# Patient Record
Sex: FEMALE | Race: WHITE | NOT HISPANIC OR LATINO | Employment: OTHER | URBAN - METROPOLITAN AREA
[De-identification: names, ages, dates, MRNs, and addresses within clinical notes are randomized per-mention and may not be internally consistent; named-entity substitution may affect disease eponyms.]

---

## 2020-05-28 ENCOUNTER — HISTORICAL (OUTPATIENT)
Dept: ADMINISTRATIVE | Facility: HOSPITAL | Age: 55
End: 2020-05-28

## 2021-06-08 ENCOUNTER — HISTORICAL (OUTPATIENT)
Dept: ADMINISTRATIVE | Facility: HOSPITAL | Age: 56
End: 2021-06-08

## 2021-06-08 LAB
ALBUMIN SERPL-MCNC: 4.3 G/DL (ref 3.8–4.9)
ALBUMIN/GLOB SERPL: 2 {RATIO} (ref 1.2–2.2)
ALP SERPL-CCNC: 65 IU/L (ref 48–121)
ALT SERPL-CCNC: 43 IU/L (ref 0–32)
AST SERPL-CCNC: 26 IU/L (ref 0–40)
BASOPHILS # BLD AUTO: 0.1 X10E3/UL (ref 0–0.2)
BASOPHILS NFR BLD AUTO: 1 %
BILIRUB SERPL-MCNC: 0.4 MG/DL (ref 0–1.2)
BUN SERPL-MCNC: 16 MG/DL (ref 6–24)
CALCIUM SERPL-MCNC: 9.5 MG/DL (ref 8.7–10.2)
CHLORIDE SERPL-SCNC: 102 MMOL/L (ref 96–106)
CHOLEST SERPL-MCNC: 197 MG/DL (ref 100–199)
CHOLEST/HDLC SERPL: 3.5 RATIO (ref 0–4.4)
CO2 SERPL-SCNC: 24 MMOL/L (ref 20–29)
CREAT SERPL-MCNC: 0.89 MG/DL (ref 0.57–1)
CREAT/UREA NIT SERPL: 18 (ref 9–23)
DEPRECATED CALCIDIOL+CALCIFEROL SERPL-MC: 24.4 NG/ML (ref 30–100)
EOSINOPHIL # BLD AUTO: 0.1 X10E3/UL (ref 0–0.4)
EOSINOPHIL NFR BLD AUTO: 2 %
ERYTHROCYTE [DISTWIDTH] IN BLOOD BY AUTOMATED COUNT: 12.2 % (ref 11.7–15.4)
GLOBULIN SER-MCNC: 2.2 G/DL (ref 1.5–4.5)
GLUCOSE SERPL-MCNC: 97 MG/DL (ref 65–99)
HCT VFR BLD AUTO: 40.3 % (ref 34–46.6)
HDLC SERPL-MCNC: 56 MG/DL
HGB BLD-MCNC: 13.3 G/DL (ref 11.1–15.9)
LDLC SERPL CALC-MCNC: 127 MG/DL (ref 0–99)
LYMPHOCYTES # BLD AUTO: 1.9 X10E3/UL (ref 0.7–3.1)
LYMPHOCYTES NFR BLD AUTO: 33 %
MCH RBC QN AUTO: 31.8 PG (ref 26.6–33)
MCHC RBC AUTO-ENTMCNC: 33 G/DL (ref 31.5–35.7)
MCV RBC AUTO: 96 FL (ref 79–97)
MONOCYTES # BLD AUTO: 0.6 X10E3/UL (ref 0.1–0.9)
MONOCYTES NFR BLD AUTO: 11 %
NEUTROPHILS # BLD AUTO: 3.1 X10E3/UL (ref 1.4–7)
NEUTROPHILS NFR BLD AUTO: 53 %
PLATELET # BLD AUTO: 245 X10E3/UL (ref 150–450)
POTASSIUM SERPL-SCNC: 4.3 MMOL/L (ref 3.5–5.2)
PROT SERPL-MCNC: 6.5 G/DL (ref 6–8.5)
RBC # BLD AUTO: 4.18 X10(6)/MCL (ref 3.77–5.28)
SODIUM SERPL-SCNC: 139 MMOL/L (ref 134–144)
TRIGL SERPL-MCNC: 78 MG/DL (ref 0–149)
TSH SERPL-ACNC: 1.46 MIU/ML (ref 0.45–4.5)
VLDLC SERPL CALC-MCNC: 14 MG/DL (ref 5–40)
WBC # SPEC AUTO: 5.8 X10E3/UL (ref 3.4–10.8)

## 2022-04-11 ENCOUNTER — HISTORICAL (OUTPATIENT)
Dept: ADMINISTRATIVE | Facility: HOSPITAL | Age: 57
End: 2022-04-11

## 2022-04-27 VITALS
WEIGHT: 198.19 LBS | HEIGHT: 66 IN | OXYGEN SATURATION: 96 % | DIASTOLIC BLOOD PRESSURE: 74 MMHG | SYSTOLIC BLOOD PRESSURE: 110 MMHG | BODY MASS INDEX: 31.85 KG/M2

## 2022-05-11 ENCOUNTER — HISTORICAL (OUTPATIENT)
Dept: ADMINISTRATIVE | Facility: HOSPITAL | Age: 57
End: 2022-05-11

## 2022-07-05 ENCOUNTER — HISTORICAL (OUTPATIENT)
Dept: ADMINISTRATIVE | Facility: HOSPITAL | Age: 57
End: 2022-07-05

## 2022-11-10 ENCOUNTER — DOCUMENTATION ONLY (OUTPATIENT)
Dept: FAMILY MEDICINE | Facility: CLINIC | Age: 57
End: 2022-11-10

## 2022-11-10 LAB — CRC RECOMMENDATION EXT: NORMAL

## 2023-02-22 ENCOUNTER — TELEPHONE (OUTPATIENT)
Dept: FAMILY MEDICINE | Facility: CLINIC | Age: 58
End: 2023-02-22

## 2023-02-22 DIAGNOSIS — B00.1 HERPES LABIALIS: Primary | ICD-10-CM

## 2023-02-22 RX ORDER — VALACYCLOVIR HYDROCHLORIDE 1 G/1
1000 TABLET, FILM COATED ORAL 2 TIMES DAILY
Qty: 60 TABLET | Refills: 11 | Status: SHIPPED | OUTPATIENT
Start: 2023-02-22 | End: 2024-03-14

## 2023-02-22 RX ORDER — ESTRADIOL 10 UG/1
1 INSERT VAGINAL
COMMUNITY
Start: 2023-01-18 | End: 2023-12-21 | Stop reason: SDUPTHER

## 2023-02-22 RX ORDER — SERTRALINE HYDROCHLORIDE 25 MG/1
25 TABLET, FILM COATED ORAL DAILY
COMMUNITY
Start: 2022-12-23 | End: 2023-10-02

## 2023-02-22 NOTE — TELEPHONE ENCOUNTER
Pt is asking for a refill on her valcyclovir 1g. Pt is asking for refills again. States that she has no more.         [facility]         [phone]

## 2023-04-10 ENCOUNTER — OFFICE VISIT (OUTPATIENT)
Dept: FAMILY MEDICINE | Facility: CLINIC | Age: 58
End: 2023-04-10
Payer: OTHER GOVERNMENT

## 2023-04-10 ENCOUNTER — TELEPHONE (OUTPATIENT)
Dept: FAMILY MEDICINE | Facility: CLINIC | Age: 58
End: 2023-04-10
Payer: OTHER GOVERNMENT

## 2023-04-10 VITALS
OXYGEN SATURATION: 97 % | BODY MASS INDEX: 31.76 KG/M2 | DIASTOLIC BLOOD PRESSURE: 62 MMHG | HEART RATE: 94 BPM | WEIGHT: 197.63 LBS | SYSTOLIC BLOOD PRESSURE: 114 MMHG | HEIGHT: 66 IN | TEMPERATURE: 98 F

## 2023-04-10 DIAGNOSIS — S00.83XA CONTUSION OF FACE, INITIAL ENCOUNTER: Primary | ICD-10-CM

## 2023-04-10 PROCEDURE — 99213 PR OFFICE/OUTPT VISIT, EST, LEVL III, 20-29 MIN: ICD-10-PCS | Mod: ,,, | Performed by: NURSE PRACTITIONER

## 2023-04-10 PROCEDURE — 99213 OFFICE O/P EST LOW 20 MIN: CPT | Mod: ,,, | Performed by: NURSE PRACTITIONER

## 2023-04-10 NOTE — PROGRESS NOTES
"SUBJECTIVE:  Cecilia Fleming is a 57 y.o. female here for Facial Injury (Fall, left eye)      HPI  Presents to the clinic with c/o facial swelling and bruising post a fall at home.  Reports her knee locked up and she fell on the left side of her face.  She is following with ortho for knee issues.  She has been applying antibiotic ointment to the abrasion.  She is concerned as the bruising seems to be worsening.  Explained that the bruising will likely continue to extend during the healing process.  Visual acuity done and WNL.  She denies any paint to the eye itself, just surrounding tissue.    Lacis allergies, medications, history, and problem list were updated as appropriate.    Review of Systems   Skin:  Positive for color change and wound.    A comprehensive review of symptoms was completed and negative except as noted above.    No results found for this or any previous visit (from the past 504 hour(s)).    OBJECTIVE:  Vital signs  Vitals:    04/10/23 0828   BP: 114/62   BP Location: Right arm   Patient Position: Sitting   Pulse: 94   Temp: 98.4 °F (36.9 °C)   TempSrc: Oral   SpO2: 97%   Weight: 89.6 kg (197 lb 9.6 oz)   Height: 5' 6.14" (1.68 m)        Physical Exam  Constitutional:       Appearance: Normal appearance.   HENT:      Head: Normocephalic and atraumatic.      Right Ear: Tympanic membrane, ear canal and external ear normal.      Left Ear: Tympanic membrane, ear canal and external ear normal.      Nose: Nose normal.      Mouth/Throat:      Mouth: Mucous membranes are moist.      Pharynx: Oropharynx is clear.   Eyes:      Extraocular Movements: Extraocular movements intact.      Conjunctiva/sclera: Conjunctivae normal.      Pupils: Pupils are equal, round, and reactive to light.      Comments: Periorbital edema and ecchymosis to lefrt eye with abrasion tl left cheek   Cardiovascular:      Rate and Rhythm: Normal rate and regular rhythm.   Pulmonary:      Effort: Pulmonary effort is normal.      " Breath sounds: Normal breath sounds.   Abdominal:      General: Bowel sounds are normal.      Palpations: Abdomen is soft.   Musculoskeletal:         General: Normal range of motion.      Cervical back: Normal range of motion and neck supple.   Skin:     General: Skin is warm.   Neurological:      General: No focal deficit present.      Mental Status: She is alert and oriented to person, place, and time.   Psychiatric:         Mood and Affect: Mood normal.         Behavior: Behavior normal.         Judgment: Judgment normal.        ASSESSMENT/PLAN:  1. Contusion of face, initial encounter  -     X-Ray Orbits  Min 4 Views; Future; Expected date: 04/10/2023    Encourage to clean wound twice daily and apply polysporin ointment or Aquaphor to keep skin moist with protective layer while abrasion heals.      Follow Up:  Follow up if symptoms worsen or fail to improve.

## 2023-04-10 NOTE — TELEPHONE ENCOUNTER
----- Message from Sherri Morrell NP sent at 4/10/2023 10:02 AM CDT -----  Please notify patient of results - no orbital fractures noted.

## 2023-06-07 LAB — BCS RECOMMENDATION EXT: NORMAL

## 2023-06-08 ENCOUNTER — DOCUMENTATION ONLY (OUTPATIENT)
Dept: ADMINISTRATIVE | Facility: HOSPITAL | Age: 58
End: 2023-06-08
Payer: OTHER GOVERNMENT

## 2023-06-13 DIAGNOSIS — Z79.890 HORMONE REPLACEMENT THERAPY: Primary | ICD-10-CM

## 2023-06-13 RX ORDER — ESTRADIOL 1 MG/G
1 GEL TOPICAL DAILY
Qty: 90 G | Refills: 3 | Status: SHIPPED | OUTPATIENT
Start: 2023-06-13 | End: 2024-03-18 | Stop reason: SDUPTHER

## 2023-07-03 ENCOUNTER — TELEPHONE (OUTPATIENT)
Dept: FAMILY MEDICINE | Facility: CLINIC | Age: 58
End: 2023-07-03
Payer: OTHER GOVERNMENT

## 2023-07-03 NOTE — TELEPHONE ENCOUNTER
Pt no showed for labs (06/29/23) that go with her wellness that is scheduled for this Wednesday.. please cancel appt for Wednesday and reschedule her labs and appt.

## 2023-07-18 ENCOUNTER — OFFICE VISIT (OUTPATIENT)
Dept: FAMILY MEDICINE | Facility: CLINIC | Age: 58
End: 2023-07-18
Payer: OTHER GOVERNMENT

## 2023-07-18 VITALS
SYSTOLIC BLOOD PRESSURE: 116 MMHG | BODY MASS INDEX: 31.69 KG/M2 | TEMPERATURE: 98 F | WEIGHT: 197.19 LBS | DIASTOLIC BLOOD PRESSURE: 68 MMHG | OXYGEN SATURATION: 97 % | HEIGHT: 66 IN | HEART RATE: 90 BPM

## 2023-07-18 DIAGNOSIS — J06.9 UPPER RESPIRATORY TRACT INFECTION, UNSPECIFIED TYPE: Primary | ICD-10-CM

## 2023-07-18 PROCEDURE — 99213 OFFICE O/P EST LOW 20 MIN: CPT | Mod: ,,, | Performed by: NURSE PRACTITIONER

## 2023-07-18 PROCEDURE — 99213 PR OFFICE/OUTPT VISIT, EST, LEVL III, 20-29 MIN: ICD-10-PCS | Mod: ,,, | Performed by: NURSE PRACTITIONER

## 2023-07-18 NOTE — PROGRESS NOTES
"SUBJECTIVE:  Cecilia Fleming is a 57 y.o. female here for Cough      Cough    Presents to the clinic with c/o cough and mild congestions.  Symptoms for the past 4 days.  Denies fever, n/v/d.    Candida allergies, medications, history, and problem list were updated as appropriate.    Review of Systems   HENT:  Positive for congestion (mild).    Respiratory:  Positive for cough.     A comprehensive review of symptoms was completed and negative except as noted above.    No results found for this or any previous visit (from the past 504 hour(s)).    OBJECTIVE:  Vital signs  Vitals:    07/18/23 0923   BP: 116/68   BP Location: Right arm   Patient Position: Sitting   BP Method: Medium (Manual)   Pulse: 90   Temp: 97.8 °F (36.6 °C)   TempSrc: Temporal   SpO2: 97%   Weight: 89.4 kg (197 lb 3.2 oz)   Height: 5' 6.14" (1.68 m)        Physical Exam  Constitutional:       Appearance: Normal appearance.   HENT:      Head: Normocephalic and atraumatic.      Right Ear: Tympanic membrane, ear canal and external ear normal.      Left Ear: Tympanic membrane, ear canal and external ear normal.      Nose: Nose normal.      Mouth/Throat:      Mouth: Mucous membranes are moist.      Pharynx: Oropharynx is clear. Posterior oropharyngeal erythema (mild with thick PND) present.   Eyes:      Conjunctiva/sclera: Conjunctivae normal.   Cardiovascular:      Rate and Rhythm: Normal rate and regular rhythm.   Pulmonary:      Effort: Pulmonary effort is normal.      Breath sounds: Normal breath sounds.   Abdominal:      General: Bowel sounds are normal.      Palpations: Abdomen is soft.   Musculoskeletal:         General: Normal range of motion.      Cervical back: Normal range of motion and neck supple.   Skin:     General: Skin is warm.      Capillary Refill: Capillary refill takes less than 2 seconds.   Neurological:      Mental Status: She is alert.   Psychiatric:         Mood and Affect: Mood normal.         Behavior: Behavior normal.        "  Judgment: Judgment normal.        ASSESSMENT/PLAN:  1. Upper respiratory tract infection, unspecified type  Comments:  viral illness, increase fluids, OTC cough/cold med prn, Deslym at night prn cough, if symtpoms do not resolve in 7-10 days RTC        Follow Up:  Follow up if symptoms worsen or fail to improve.

## 2023-07-26 ENCOUNTER — APPOINTMENT (OUTPATIENT)
Dept: LAB | Facility: HOSPITAL | Age: 58
End: 2023-07-26
Attending: FAMILY MEDICINE
Payer: OTHER GOVERNMENT

## 2023-08-08 ENCOUNTER — OFFICE VISIT (OUTPATIENT)
Dept: FAMILY MEDICINE | Facility: CLINIC | Age: 58
End: 2023-08-08
Payer: OTHER GOVERNMENT

## 2023-08-08 VITALS
HEIGHT: 66 IN | SYSTOLIC BLOOD PRESSURE: 122 MMHG | HEART RATE: 91 BPM | TEMPERATURE: 98 F | DIASTOLIC BLOOD PRESSURE: 68 MMHG | BODY MASS INDEX: 31.92 KG/M2 | OXYGEN SATURATION: 98 % | WEIGHT: 198.63 LBS

## 2023-08-08 DIAGNOSIS — G47.33 OBSTRUCTIVE SLEEP APNEA SYNDROME: ICD-10-CM

## 2023-08-08 DIAGNOSIS — Z00.01 ENCOUNTER FOR WELL ADULT EXAM WITH ABNORMAL FINDINGS: Primary | ICD-10-CM

## 2023-08-08 DIAGNOSIS — E78.00 PURE HYPERCHOLESTEROLEMIA: ICD-10-CM

## 2023-08-08 PROBLEM — K57.30 DIVERTICULOSIS OF COLON: Status: ACTIVE | Noted: 2023-08-08

## 2023-08-08 PROBLEM — E55.9 VITAMIN D DEFICIENCY: Status: ACTIVE | Noted: 2023-08-08

## 2023-08-08 PROBLEM — E78.5 HYPERLIPIDEMIA: Status: ACTIVE | Noted: 2023-08-08

## 2023-08-08 PROCEDURE — 99396 PR PREVENTIVE VISIT,EST,40-64: ICD-10-PCS | Mod: ,,, | Performed by: FAMILY MEDICINE

## 2023-08-08 PROCEDURE — 99396 PREV VISIT EST AGE 40-64: CPT | Mod: ,,, | Performed by: FAMILY MEDICINE

## 2023-08-08 NOTE — PROGRESS NOTES
"SUBJECTIVE:  HPI    Cecilia Fleming is a 57 y.o. female here for Follow-up (labs), annual wellness exam and follow-up on lab work.    No current problems or concerns.  She is feeling well.  Mammogram is up-to-date.  Colonoscopy is up-to-date.      Candida allergies, medications, history, and problem list were updated as appropriate.    ROS:  Constitutional:  No fever, chills, weight loss, malaise, fatigue   ENT:  No runny nose, no sore throat  Cardiovascular:  No palpitations, angina, syncope   Respiratory:  No shortness of breath, cough, hemoptysis  GI: No abdominal pain, diarrhea, change in stools  : No dysuria, hematuria  Skin:  No rashes or lesions of concern   Neuro:  No headaches, paresthesias, weakness  Endocrine:  No polyuria, polydipsia, polyphasia  Psychiatric: No depression or anxiety    OBJECTIVE:  Vital signs  Visit Vitals  /68 (BP Location: Right arm, Patient Position: Sitting)   Pulse 91   Temp 98.1 °F (36.7 °C) (Temporal)   Ht 5' 5.95" (1.675 m)   Wt 90.1 kg (198 lb 9.6 oz)   LMP  (LMP Unknown)   SpO2 98%   BMI 32.11 kg/m²          PHYSICAL EXAM:  General: Awake, alert, no acute distress  Neck:  No bruits, no masses  Cardiovascular:  Regular rhythm.  Normal rate.  No murmurs.  Respiratory: Clear to auscultation bilaterally, normal effort  Extremities:  No cyanosis, no clubbing, no edema  Skin:  No rashes or appreciable lesions   Neuro:  Cranial nerves 2-12 are intact with usual testing.  Gait is normal.  Moves all 4 extremities equally and symmetrically.  Psychiatric:  Normal mood and affect.    Chemistry:  Lab Results   Component Value Date     07/26/2023    K 4.4 07/26/2023    BUN 18.0 07/26/2023    CREATININE 0.94 07/26/2023    EGFRNORACEVR 71 07/26/2023    GLUCOSE 100 07/26/2023    CALCIUM 9.6 07/26/2023    ALKPHOS 81 07/26/2023    AST 32 07/26/2023    ALT 44 07/26/2023    OZDFMPTO33DK 24.4 (L) 06/08/2021    TSH 2.630 07/26/2023        Lab Results   Component Value Date    HGBA1C " "5.6 07/26/2023        Hematology:  Lab Results   Component Value Date    WBC 7.11 07/26/2023    HGB 13.5 07/26/2023    MCV 94.4 07/26/2023     07/26/2023       Lipid Panel:  Lab Results   Component Value Date    CHOL 205 (H) 07/26/2023    HDL 47 07/26/2023    DLDL 132.2 (H) 07/26/2023    TRIG 124 07/26/2023    TOTALCHOLEST 3.5 06/08/2021        ASSESSMENT/PLAN:  1. Encounter for well adult exam with abnormal findings  Assessment & Plan:  Therapeutic lifestyle changes to include low-fat diet, regular exercise    Orders:  -     Lipid Panel; Future; Expected date: 08/08/2024  -     CBC Auto Differential; Future; Expected date: 08/08/2024  -     Comprehensive Metabolic Panel; Future; Expected date: 08/08/2024  -     TSH; Future; Expected date: 08/08/2024  -     Hemoglobin A1C; Future; Expected date: 08/07/2024    2. Pure hypercholesterolemia  Overview:  Lab Results   Component Value Date    CHOL 205 (H) 07/26/2023    CHOL 197 06/08/2021     Lab Results   Component Value Date    HDL 47 07/26/2023    HDL 56 06/08/2021     No results found for: "LDLCALC"  Lab Results   Component Value Date    DLDL 132.2 (H) 07/26/2023     Lab Results   Component Value Date    TRIG 124 07/26/2023    TRIG 78 06/08/2021       f1 No results found for: "CHOLHDL"      Assessment & Plan:  Therapeutic lifestyle changes     Continue to monitor    Orders:  -     Lipid Panel; Future; Expected date: 08/08/2024    3. Obstructive sleep apnea syndrome  Overview:  on CPAP from sleep disorder center of [facility]    Assessment & Plan:  Compliant with CPAP.  Symptoms controlled.      Follow Up:  Follow up in about 1 year (around 8/8/2024) for Fasting labs, Well Adult.          "

## 2023-09-14 ENCOUNTER — OFFICE VISIT (OUTPATIENT)
Dept: FAMILY MEDICINE | Facility: CLINIC | Age: 58
End: 2023-09-14
Payer: OTHER GOVERNMENT

## 2023-09-14 VITALS
SYSTOLIC BLOOD PRESSURE: 122 MMHG | HEIGHT: 66 IN | BODY MASS INDEX: 32.24 KG/M2 | DIASTOLIC BLOOD PRESSURE: 72 MMHG | HEART RATE: 75 BPM | TEMPERATURE: 98 F | WEIGHT: 200.63 LBS | OXYGEN SATURATION: 96 %

## 2023-09-14 DIAGNOSIS — S93.145S: Primary | ICD-10-CM

## 2023-09-14 DIAGNOSIS — E78.00 PURE HYPERCHOLESTEROLEMIA: ICD-10-CM

## 2023-09-14 DIAGNOSIS — G47.33 OBSTRUCTIVE SLEEP APNEA SYNDROME: ICD-10-CM

## 2023-09-14 DIAGNOSIS — Z01.818 PREOPERATIVE CLEARANCE: ICD-10-CM

## 2023-09-14 PROBLEM — S93.142A: Status: ACTIVE | Noted: 2023-09-14

## 2023-09-14 PROCEDURE — 99214 OFFICE O/P EST MOD 30 MIN: CPT | Mod: ,,, | Performed by: FAMILY MEDICINE

## 2023-09-14 PROCEDURE — 99214 PR OFFICE/OUTPT VISIT, EST, LEVL IV, 30-39 MIN: ICD-10-PCS | Mod: ,,, | Performed by: FAMILY MEDICINE

## 2023-09-14 NOTE — PROGRESS NOTES
"SUBJECTIVE:  HPI    Cecilia Flmeing is a 57 y.o. female here for Pre-operative Clearance for left 1st metatarsophalangeal joint subluxation surgery.    She denies any chest pain, angina, dyspnea, exertional symptoms.      Lacis allergies, medications, history, and problem list were updated as appropriate.    ROS:  Pertinent ROS as above, otherwise negative    OBJECTIVE:  Vital signs  Visit Vitals  /72 (BP Location: Left arm, Patient Position: Sitting)   Pulse 75   Temp 97.5 °F (36.4 °C) (Temporal)   Ht 5' 5.95" (1.675 m)   Wt 91 kg (200 lb 9.6 oz)   LMP  (LMP Unknown)   SpO2 96%   BMI 32.43 kg/m²          PHYSICAL EXAM:  General: Awake, alert, no acute distress   Neck:  No bruits, no masses  Cardiovascular:  Regular rhythm.  Normal rate.  No murmurs.  Respiratory: Clear to auscultation bilaterally, normal effort  Abdomen: Soft, nontender, nondistended, no hepatosplenomegaly   Extremities:  No cyanosis, no clubbing, no edema  Skin:  No rashes or appreciable lesions   Neuro:  Cranial nerves 2-12 are intact with usual testing.  Gait is normal.  Moves all 4 extremities equally and symmetrically.  Psychiatric:  Normal mood and affect.    September 12, 2023:  CBC and BMP normal (glucose 109)     September 12, 2023, EKG:  Normal sinus rhythm, normal intervals, normal complexes.  Normal EKG.    ASSESSMENT/PLAN:  1. Subluxation of metatarsophalangeal joint of left lesser toe(s), sequela  Assessment & Plan:  2nd toe      2. Preoperative clearance  Assessment & Plan:  Cleared at very low risk      3. Obstructive sleep apnea syndrome  Overview:  on CPAP from sleep disorder center [facility]      4. Pure hypercholesterolemia  Overview:  Lab Results   Component Value Date    CHOL 205 (H) 07/26/2023    CHOL 197 06/08/2021     Lab Results   Component Value Date    HDL 47 07/26/2023    HDL 56 06/08/2021     No results found for: "LDLCALC"  Lab Results   Component Value Date    DLDL 132.2 (H) 07/26/2023     Lab Results " "  Component Value Date    TRIG 124 07/26/2023    TRIG 78 06/08/2021       f1 No results found for: "CHOLHDL"          "

## 2023-09-30 DIAGNOSIS — F32.5 MAJOR DEPRESSIVE DISORDER IN FULL REMISSION, UNSPECIFIED WHETHER RECURRENT: Primary | ICD-10-CM

## 2023-10-02 RX ORDER — SERTRALINE HYDROCHLORIDE 25 MG/1
25 TABLET, FILM COATED ORAL
Qty: 90 TABLET | Refills: 3 | Status: SHIPPED | OUTPATIENT
Start: 2023-10-02

## 2023-11-13 PROBLEM — Z00.01 ENCOUNTER FOR WELL ADULT EXAM WITH ABNORMAL FINDINGS: Status: RESOLVED | Noted: 2023-08-08 | Resolved: 2023-11-13

## 2023-11-20 ENCOUNTER — TELEPHONE (OUTPATIENT)
Dept: FAMILY MEDICINE | Facility: CLINIC | Age: 58
End: 2023-11-20
Payer: OTHER GOVERNMENT

## 2023-11-20 DIAGNOSIS — K57.92 ACUTE DIVERTICULITIS: Primary | ICD-10-CM

## 2023-11-20 RX ORDER — CIPROFLOXACIN 500 MG/1
500 TABLET ORAL 2 TIMES DAILY
Qty: 20 TABLET | Refills: 0 | Status: SHIPPED | OUTPATIENT
Start: 2023-11-20 | End: 2023-11-30

## 2023-11-20 RX ORDER — METRONIDAZOLE 500 MG/1
500 TABLET ORAL 3 TIMES DAILY
Qty: 30 TABLET | Refills: 0 | Status: SHIPPED | OUTPATIENT
Start: 2023-11-20 | End: 2023-11-30

## 2023-11-20 NOTE — TELEPHONE ENCOUNTER
----- Message from Yadira Mcdaniels LPN sent at 11/20/2023  9:06 AM CST -----  Regarding: FW: Appt or call out  Can something be sent in or does she need to be seen?  ----- Message -----  From: Dory Celaya MA  Sent: 11/20/2023   8:18 AM CST  To: Levon TURNER Staff  Subject: Appt or call out                                 She's having a bout of diverticulitis and either needs an appointment or have some meds called out.     [phone]

## 2023-12-21 DIAGNOSIS — Z79.890 HORMONE REPLACEMENT THERAPY: Primary | ICD-10-CM

## 2023-12-21 DIAGNOSIS — Z79.890 HORMONE REPLACEMENT THERAPY: ICD-10-CM

## 2023-12-21 RX ORDER — ESTRADIOL 10 UG/1
1 INSERT VAGINAL
Qty: 24 TABLET | Refills: 3 | Status: SHIPPED | OUTPATIENT
Start: 2023-12-21 | End: 2023-12-21 | Stop reason: SDUPTHER

## 2023-12-21 RX ORDER — ESTRADIOL 10 UG/1
1 INSERT VAGINAL
Qty: 24 TABLET | Refills: 3 | Status: SHIPPED | OUTPATIENT
Start: 2023-12-21 | End: 2024-03-14

## 2023-12-21 NOTE — TELEPHONE ENCOUNTER
Refill request faxed from outpatient for Vagifem 10 mcg tab, insert one tab vaginally twice a week

## 2024-03-14 ENCOUNTER — OFFICE VISIT (OUTPATIENT)
Dept: FAMILY MEDICINE | Facility: CLINIC | Age: 59
End: 2024-03-14
Payer: OTHER GOVERNMENT

## 2024-03-14 VITALS
HEART RATE: 85 BPM | DIASTOLIC BLOOD PRESSURE: 70 MMHG | SYSTOLIC BLOOD PRESSURE: 118 MMHG | TEMPERATURE: 98 F | OXYGEN SATURATION: 95 % | WEIGHT: 197.63 LBS | HEIGHT: 66 IN | BODY MASS INDEX: 31.76 KG/M2

## 2024-03-14 DIAGNOSIS — J01.80 ACUTE NON-RECURRENT SINUSITIS OF OTHER SINUS: Primary | ICD-10-CM

## 2024-03-14 PROBLEM — J01.90 ACUTE NON-RECURRENT SINUSITIS: Status: ACTIVE | Noted: 2024-03-14

## 2024-03-14 PROCEDURE — 99214 OFFICE O/P EST MOD 30 MIN: CPT | Mod: ,,, | Performed by: FAMILY MEDICINE

## 2024-03-14 RX ORDER — PREDNISONE 20 MG/1
60 TABLET ORAL DAILY
Qty: 15 TABLET | Refills: 0 | Status: SHIPPED | OUTPATIENT
Start: 2024-03-14 | End: 2024-03-19

## 2024-03-14 RX ORDER — AZITHROMYCIN 250 MG/1
TABLET, FILM COATED ORAL
Qty: 6 TABLET | Refills: 0 | Status: SHIPPED | OUTPATIENT
Start: 2024-03-14 | End: 2024-03-18

## 2024-03-14 NOTE — PROGRESS NOTES
"SUBJECTIVE:  HPI    Cecilia Fleming is a 58 y.o. female here for Cough (Since Friday), Fever, Ear Fullness (bilateral), Nasal Congestion, and Chest Congestion.  Six day history of persistent sinus congestion, left aural fullness, chest congestion and cough as well as facial pain and pressure.  She felt really bad for 2 days with low-grade fever of 100° generalized myalgias.  Feels a bit better today still has a lot of facial pain and pressure.      Lacis allergies, medications, history, and problem list were updated as appropriate.    ROS:  Pertinent ROS as above, otherwise negative    OBJECTIVE:  Vital signs  Visit Vitals  /70 (BP Location: Left arm, Patient Position: Sitting, BP Method: Medium (Manual))   Pulse 85   Temp 98.1 °F (36.7 °C) (Temporal)   Ht 5' 5.95" (1.675 m)   Wt 89.6 kg (197 lb 9.6 oz)   LMP  (LMP Unknown)   SpO2 95%   BMI 31.95 kg/m²          PHYSICAL EXAM:  General:  Awake, alert, no acute distress   Eyes:  Pupils equal, round, reactive to light.  Conjunctiva normal bilaterally.  Ears:  Bilateral external auditory canals normal.  Bilateral tympanic membranes normal.  Nares:  Bilateral turbinate erythema and edema with clear rhinorrhea.  Oropharynx:  Postnasal drainage present.  No erythema or exudate.  Neck:  No lymphadenopathy  Cardiovascular: Regular rate and rhythm.  No murmurs.  Respiratory: Clear to auscultation bilaterally, normal effort  Skin: No rashes    ASSESSMENT/PLAN:  1. Acute non-recurrent sinusitis of other sinus  Assessment & Plan:  Probable secondary sinusitis after recent viral illness     Prednisone for 5 days with Z-Tala    Orders:  -     azithromycin (Z-TALA) 250 MG tablet; Take 2 tablets (500 mg total) by mouth once daily for 1 day, THEN 1 tablet (250 mg total) once daily for 4 days.  Dispense: 6 tablet; Refill: 0  -     predniSONE (DELTASONE) 20 MG tablet; Take 3 tablets (60 mg total) by mouth once daily. for 5 days  Dispense: 15 tablet; Refill: 0        "

## 2024-03-18 ENCOUNTER — OFFICE VISIT (OUTPATIENT)
Dept: OBSTETRICS AND GYNECOLOGY | Facility: CLINIC | Age: 59
End: 2024-03-18
Payer: OTHER GOVERNMENT

## 2024-03-18 VITALS
DIASTOLIC BLOOD PRESSURE: 77 MMHG | SYSTOLIC BLOOD PRESSURE: 123 MMHG | WEIGHT: 200 LBS | BODY MASS INDEX: 33.32 KG/M2 | HEART RATE: 77 BPM | HEIGHT: 65 IN

## 2024-03-18 DIAGNOSIS — Z79.890 HORMONE REPLACEMENT THERAPY: ICD-10-CM

## 2024-03-18 DIAGNOSIS — Z78.0 MENOPAUSE: ICD-10-CM

## 2024-03-18 DIAGNOSIS — Z01.419 WELL WOMAN EXAM WITH ROUTINE GYNECOLOGICAL EXAM: ICD-10-CM

## 2024-03-18 DIAGNOSIS — Z12.31 SCREENING MAMMOGRAM FOR BREAST CANCER: ICD-10-CM

## 2024-03-18 DIAGNOSIS — Z12.4 SCREENING FOR MALIGNANT NEOPLASM OF CERVIX: Primary | ICD-10-CM

## 2024-03-18 PROCEDURE — 99396 PREV VISIT EST AGE 40-64: CPT | Mod: S$GLB,,, | Performed by: OBSTETRICS & GYNECOLOGY

## 2024-03-18 RX ORDER — TIRZEPATIDE 5 MG/.5ML
5 INJECTION, SOLUTION SUBCUTANEOUS
Qty: 4 PEN | Refills: 6 | Status: SHIPPED | OUTPATIENT
Start: 2024-03-18 | End: 2024-05-03

## 2024-03-18 RX ORDER — ESTRADIOL 10 UG/1
10 INSERT VAGINAL
Qty: 24 TABLET | Refills: 3 | Status: SHIPPED | OUTPATIENT
Start: 2024-03-18 | End: 2025-03-18

## 2024-03-18 RX ORDER — ESTRADIOL 1 MG/G
1 GEL TOPICAL DAILY
Qty: 90 G | Refills: 3 | Status: SHIPPED | OUTPATIENT
Start: 2024-03-18

## 2024-03-18 NOTE — PROGRESS NOTES
Subjective:       Patient ID: Cecilia Fleming is a 58 y.o. female.    Chief Complaint:  Well Woman      History of Present Illness  She is doing well.  No new health issues,  No abnormal bleeding, No GI or Gu concerns,  No dyspareunia.   She is doing well  is a former patient of [facility].    She has her ovaries.    She is on her HRT   HPI      GYN & OB History  No LMP recorded (lmp unknown). Patient has had a hysterectomy.     Date of Last Pap: No result found    OB History    Para Term  AB Living   8 8 8     8   SAB IAB Ectopic Multiple Live Births                  # Outcome Date GA Lbr Phani/2nd Weight Sex Delivery Anes PTL Lv   8 Term            7 Term            6 Term            5 Term            4 Term            3 Term            2 Term            1 Term                Review of Systems  Review of Systems   Constitutional:  Negative for activity change.   Eyes:  Negative for visual disturbance.   Respiratory:  Negative for shortness of breath.    Cardiovascular:  Negative for chest pain.   Gastrointestinal:  Negative for abdominal pain.   Genitourinary:  Negative for vaginal bleeding.        No abnormal vaginal bleeding   Musculoskeletal:  Negative for back pain.   Integumentary:  Negative for rash and breast mass.   Neurological:  Negative for numbness.   Psychiatric/Behavioral:          No mood disturbance or changes    Breast: Negative for mass            Objective:    Physical Exam:   Constitutional: She is oriented to person, place, and time. She appears well-developed.    HENT:   Head: Normocephalic.     Neck: Trachea normal. No thyromegaly present.    Cardiovascular:  Normal rate, regular rhythm and normal heart sounds.             Pulmonary/Chest: Effort normal and breath sounds normal. Right breast exhibits no mass, no nipple discharge and no skin change. Left breast exhibits no mass, no nipple discharge and no skin change.   Mild fibrocystic changes    During the exam self breast exam  discussed         Abdominal: Soft. There is no abdominal tenderness. There is no rigidity and no guarding.     Genitourinary:    Vagina normal.      Pelvic exam was performed with patient supine.     Labial bartholins normal.There is no lesion on the right labia. There is no lesion on the left labia. Right adnexum displays no mass and no tenderness. Left adnexum displays no mass and no tenderness. Cervix is absent.Uterus is absent.              Lymphadenopathy:        Head (right side): No submental and no submandibular adenopathy present.        Head (left side): No submental and no submandibular adenopathy present.     She has no cervical adenopathy.    Neurological: She is alert and oriented to person, place, and time.    Skin: Skin is warm.    Psychiatric: She has a normal mood and affect. Her speech is normal and behavior is normal. Thought content normal.          Assessment:        1. Screening for malignant neoplasm of cervix    2. Screening mammogram for breast cancer    3. BMI 33.0-33.9,adult    4. Well woman exam with routine gynecological exam    5. Menopause               Plan:           Zepbound discussed R&B   will try this  she is interested      Pap   Mammogram  Follow up one year or sooner if needed  Self breast exams  Consider health profile if labs not done with PCP  Recommend colonoscopy as indicated  Bone density discussed   Pt is aware we call all results. If she does not hear from our office regarding her result within a week of having a study or procedure performed she is to call the office so that we can research the result for her as I do not know when she is scheduling her procedures.   Chaperone was present

## 2024-04-29 ENCOUNTER — PATIENT MESSAGE (OUTPATIENT)
Dept: ADMINISTRATIVE | Facility: HOSPITAL | Age: 59
End: 2024-04-29
Payer: OTHER GOVERNMENT

## 2024-04-30 ENCOUNTER — PATIENT MESSAGE (OUTPATIENT)
Dept: ADMINISTRATIVE | Facility: HOSPITAL | Age: 59
End: 2024-04-30
Payer: OTHER GOVERNMENT

## 2024-05-02 ENCOUNTER — PATIENT MESSAGE (OUTPATIENT)
Dept: FAMILY MEDICINE | Facility: CLINIC | Age: 59
End: 2024-05-02
Payer: OTHER GOVERNMENT

## 2024-05-03 ENCOUNTER — OFFICE VISIT (OUTPATIENT)
Dept: FAMILY MEDICINE | Facility: CLINIC | Age: 59
End: 2024-05-03
Payer: OTHER GOVERNMENT

## 2024-05-03 VITALS
SYSTOLIC BLOOD PRESSURE: 110 MMHG | WEIGHT: 199.19 LBS | HEIGHT: 65 IN | DIASTOLIC BLOOD PRESSURE: 74 MMHG | TEMPERATURE: 98 F | BODY MASS INDEX: 33.19 KG/M2 | OXYGEN SATURATION: 96 % | HEART RATE: 67 BPM

## 2024-05-03 DIAGNOSIS — K57.92 ACUTE DIVERTICULITIS: Primary | ICD-10-CM

## 2024-05-03 PROCEDURE — 99214 OFFICE O/P EST MOD 30 MIN: CPT | Mod: ,,, | Performed by: FAMILY MEDICINE

## 2024-05-03 RX ORDER — PHENAZOPYRIDINE HYDROCHLORIDE 100 MG/1
200 TABLET, FILM COATED ORAL
COMMUNITY
Start: 2024-04-30 | End: 2024-05-09

## 2024-05-03 RX ORDER — METRONIDAZOLE 500 MG/1
500 TABLET ORAL 3 TIMES DAILY
Qty: 42 TABLET | Refills: 0 | Status: SHIPPED | OUTPATIENT
Start: 2024-05-03 | End: 2024-05-17

## 2024-05-03 RX ORDER — CIPROFLOXACIN 500 MG/1
500 TABLET ORAL 2 TIMES DAILY
Qty: 14 TABLET | Refills: 0 | Status: SHIPPED | OUTPATIENT
Start: 2024-05-03 | End: 2024-05-10

## 2024-05-03 RX ORDER — CIPROFLOXACIN 500 MG/1
500 TABLET ORAL 2 TIMES DAILY
COMMUNITY
Start: 2024-04-30 | End: 2024-05-03 | Stop reason: SDUPTHER

## 2024-05-03 NOTE — ASSESSMENT & PLAN NOTE
Cipro 500 mg b.i.d. for 14 days     Metronidazole 500 mg t.i.d. for 14 days     Liquid diet and advance as tolerated    Motrin and Tylenol for pain control     Regular use of MiraLax to keep bowels soft and moving

## 2024-05-03 NOTE — PROGRESS NOTES
"SUBJECTIVE:  HPI    Cecilia Fleming is a 58 y.o. female here for Diverticulitis.  Went to an urgent care clinic out of town because she thought maybe she had a urinary tract infection.  She was treated with ciprofloxacin and Pyridium.  She continues to have left lower quadrant and suprapubic abdominal pain associated with the bloating and fullness.  She has some very mild nausea.  She has not had any mucus or blood in the stool.  She denies any fever.  She reports these symptoms are very similar to when she had diverticulitis on at least 3 other occasions.    Lacis allergies, medications, history, and problem list were updated as appropriate.    ROS:  Pertinent ROS as above, otherwise negative    OBJECTIVE:  Vital signs  Visit Vitals  /74 (BP Location: Left arm, Patient Position: Sitting, BP Method: Medium (Manual))   Pulse 67   Temp 97.9 °F (36.6 °C) (Temporal)   Ht 5' 5" (1.651 m)   Wt 90.4 kg (199 lb 3.2 oz)   LMP  (LMP Unknown)   SpO2 96%   BMI 33.15 kg/m²          PHYSICAL EXAM:  General:  Awake, alert, no acute distress   Eyes:  Pupils equal, round, reactive to light.  Conjunctiva normal bilaterally.  Cardiovascular: Regular rate and rhythm.  No murmurs.  Respiratory: Clear to auscultation bilaterally, normal effort  Abdomen: Soft, left lower quadrant and suprapubic tenderness without guarding or rebound, nondistended, no hepatosplenomegaly or masses  Extremities:  No peripheral edema, no cyanosis  Skin: No rashes      ASSESSMENT/PLAN:  1. Acute diverticulitis  Assessment & Plan:  Cipro 500 mg b.i.d. for 14 days     Metronidazole 500 mg t.i.d. for 14 days     Liquid diet and advance as tolerated    Motrin and Tylenol for pain control     Regular use of MiraLax to keep bowels soft and moving    Orders:  -     metroNIDAZOLE (FLAGYL) 500 MG tablet; Take 1 tablet (500 mg total) by mouth 3 (three) times daily. for 14 days  Dispense: 42 tablet; Refill: 0  -     ciprofloxacin HCl (CIPRO) 500 MG tablet; Take " 1 tablet (500 mg total) by mouth 2 (two) times daily. for 7 days  Dispense: 14 tablet; Refill: 0            Follow Up:  Follow up in about 1 week (around 5/10/2024) for Diverticulitis.

## 2024-05-09 ENCOUNTER — OFFICE VISIT (OUTPATIENT)
Dept: FAMILY MEDICINE | Facility: CLINIC | Age: 59
End: 2024-05-09
Payer: OTHER GOVERNMENT

## 2024-05-09 VITALS
SYSTOLIC BLOOD PRESSURE: 116 MMHG | HEART RATE: 79 BPM | OXYGEN SATURATION: 97 % | BODY MASS INDEX: 32.99 KG/M2 | WEIGHT: 198 LBS | HEIGHT: 65 IN | TEMPERATURE: 98 F | DIASTOLIC BLOOD PRESSURE: 70 MMHG

## 2024-05-09 DIAGNOSIS — K57.92 ACUTE DIVERTICULITIS: Primary | ICD-10-CM

## 2024-05-09 PROBLEM — S93.145S: Status: RESOLVED | Noted: 2023-09-14 | Resolved: 2024-05-09

## 2024-05-09 PROBLEM — J01.90 ACUTE NON-RECURRENT SINUSITIS: Status: RESOLVED | Noted: 2024-03-14 | Resolved: 2024-05-09

## 2024-05-09 PROBLEM — Z01.818 PREOPERATIVE CLEARANCE: Status: RESOLVED | Noted: 2023-09-14 | Resolved: 2024-05-09

## 2024-05-09 PROCEDURE — 99213 OFFICE O/P EST LOW 20 MIN: CPT | Mod: ,,, | Performed by: FAMILY MEDICINE

## 2024-05-09 NOTE — PROGRESS NOTES
"SUBJECTIVE:  HPI    Cecilia Fleming is a 58 y.o. female here for follow-up on diverticulitis.  She reports that she feels about 90% better.  She has no pain, mucus in the stool, blood in the stool, fever, chills.  She is having a little bit of diarrhea because of the antibiotic therapy.    Lacis allergies, medications, history, and problem list were updated as appropriate.    ROS:  Pertinent ROS as above, otherwise negative    OBJECTIVE:  Vital signs  Visit Vitals  /70 (BP Location: Left arm, Patient Position: Sitting)   Pulse 79   Temp 98.2 °F (36.8 °C) (Oral)   Ht 5' 5" (1.651 m)   Wt 89.8 kg (198 lb)   LMP  (LMP Unknown)   SpO2 97%   BMI 32.95 kg/m²          PHYSICAL EXAM:  General:  Awake, alert, no acute distress   Eyes:  Pupils equal, round, reactive to light.  Conjunctiva normal bilaterally.  Abdomen: Soft, nontender, nondistended, no hepatosplenomegaly or masses  Extremities:  No peripheral edema, no cyanosis  Skin: No rashes      ASSESSMENT/PLAN:  1. Acute diverticulitis  Assessment & Plan:  Resolving, complete 10 days of antibiotic therapy      Discussed bowel regimen to keep bowels regular and soft  "

## 2024-05-29 ENCOUNTER — TELEPHONE (OUTPATIENT)
Dept: GASTROENTEROLOGY | Facility: CLINIC | Age: 59
End: 2024-05-29
Payer: OTHER GOVERNMENT

## 2024-05-29 NOTE — TELEPHONE ENCOUNTER
----- Message from Idalia Rivera sent at 5/29/2024  9:48 AM CDT -----  Contact: self  Patient is requesting a call back regarding scheduling. Please call back at [phone]

## 2024-05-29 NOTE — TELEPHONE ENCOUNTER
Patient was advised to have her PCP send us a referral, ov notes, recent labs, and any imaging reports. DMP

## 2024-06-25 ENCOUNTER — TELEPHONE (OUTPATIENT)
Dept: FAMILY MEDICINE | Facility: CLINIC | Age: 59
End: 2024-06-25
Payer: OTHER GOVERNMENT

## 2024-06-25 NOTE — TELEPHONE ENCOUNTER
----- Message from Shane Dos Santos MD sent at 6/25/2024  9:16 AM CDT -----  Mammogram okay.  Repeat 1 year.

## 2024-08-01 ENCOUNTER — TELEPHONE (OUTPATIENT)
Dept: OBSTETRICS AND GYNECOLOGY | Facility: CLINIC | Age: 59
End: 2024-08-01
Payer: OTHER GOVERNMENT

## 2024-08-01 DIAGNOSIS — N64.4 BREAST PAIN, RIGHT: Primary | ICD-10-CM

## 2024-08-01 NOTE — TELEPHONE ENCOUNTER
----- Message from Bree Hudson sent at 8/1/2024 10:06 AM CDT -----  Contact: Cecilia  Patient is calling in regards to she would like to have an ultrasound ordered for her right breast, she says that there is pain off and on and she never felt this type of pain before and she just had a mammogram and it was normal. She would just like to get it checked out. Call Back is [phone]

## 2024-08-14 ENCOUNTER — OFFICE VISIT (OUTPATIENT)
Dept: FAMILY MEDICINE | Facility: CLINIC | Age: 59
End: 2024-08-14
Payer: OTHER GOVERNMENT

## 2024-08-14 VITALS
BODY MASS INDEX: 33.85 KG/M2 | HEART RATE: 89 BPM | OXYGEN SATURATION: 96 % | HEIGHT: 65 IN | SYSTOLIC BLOOD PRESSURE: 126 MMHG | WEIGHT: 203.19 LBS | TEMPERATURE: 99 F | DIASTOLIC BLOOD PRESSURE: 74 MMHG

## 2024-08-14 DIAGNOSIS — E78.2 MIXED HYPERLIPIDEMIA: ICD-10-CM

## 2024-08-14 DIAGNOSIS — R73.03 PREDIABETES: ICD-10-CM

## 2024-08-14 DIAGNOSIS — Z00.01 ENCOUNTER FOR WELL ADULT EXAM WITH ABNORMAL FINDINGS: Primary | ICD-10-CM

## 2024-08-14 PROCEDURE — 99396 PREV VISIT EST AGE 40-64: CPT | Mod: ,,, | Performed by: FAMILY MEDICINE

## 2024-08-14 NOTE — PROGRESS NOTES
"SUBJECTIVE:  HPI    Cecilia Fleming is a 58 y.o. female here for Annual Exam (Lab results).     She is without any complaints or concerns at this time.  She is feeling well.  She remains pretty active.      Lacis allergies, medications, history, and problem list were updated as appropriate.    ROS:  Pertinent ROS as above, otherwise negative 12 point review of systems    OBJECTIVE:  Vital signs  Visit Vitals  /74 (BP Location: Left arm, Patient Position: Sitting, BP Method: Large (Manual))   Pulse 89   Temp 98.9 °F (37.2 °C) (Oral)   Ht 5' 5" (1.651 m)   Wt 92.2 kg (203 lb 3.2 oz)   LMP  (LMP Unknown)   SpO2 96%   BMI 33.81 kg/m²          PHYSICAL EXAM:  General: Awake, alert, no acute distress  Neck:  No bruits, no masses  Cardiovascular:  Regular rhythm.  Normal rate.  No murmurs.  Respiratory: Clear to auscultation bilaterally, normal effort  Extremities:  No cyanosis, no clubbing, no edema  Skin:  No rashes or appreciable lesions   Neuro:  Cranial nerves 2-12 are intact with usual testing.  Gait is normal.  Moves all 4 extremities equally and symmetrically.  Psychiatric:  Normal mood and affect.    Chemistry:  Lab Results   Component Value Date     08/05/2024    K 4.5 08/05/2024    BUN 16 08/05/2024    CREATININE 0.98 08/05/2024    EGFRNORACEVR 67 08/05/2024    GLUCOSE 100 07/26/2023    CALCIUM 9.6 08/05/2024    ALKPHOS 81 07/26/2023    AST 22 08/05/2024    ALT 31 08/05/2024    QVVKUNQR36BE 24.4 (L) 06/08/2021    TSH 2.520 08/05/2024        Lab Results   Component Value Date    HGBA1C 5.7 (H) 08/05/2024        Hematology:  Lab Results   Component Value Date    WBC 5.4 08/05/2024    HGB 13.6 08/05/2024    MCV 96 08/05/2024     08/05/2024       Lipid Panel:  Lab Results   Component Value Date    CHOL 211 (H) 08/05/2024    HDL 56 08/05/2024    TRIG 93 08/05/2024    TOTALCHOLEST 3.5 06/08/2021        Ten year cardiovascular risk 2.7%, lifetime risk 39%, optimal risk 1.9%    ASSESSMENT/PLAN:  1. " "Encounter for well adult exam with abnormal findings  Assessment & Plan:  Therapeutic lifestyle changes to include low-fat diet, regular exercise    Orders:  -     Lipid Panel; Future; Expected date: 08/14/2025  -     CBC Auto Differential; Future; Expected date: 08/14/2025  -     Comprehensive Metabolic Panel; Future; Expected date: 08/14/2025  -     TSH; Future; Expected date: 08/14/2025  -     Hemoglobin A1C; Future; Expected date: 08/14/2025    2. Prediabetes  Overview:  Lab Results   Component Value Date    HGBA1C 5.7 (H) 08/05/2024         Assessment & Plan:  Therapeutic lifestyle changes to include regular exercise    Orders:  -     Hemoglobin A1C; Future; Expected date: 08/14/2025    3. Mixed hyperlipidemia  Overview:  Lab Results   Component Value Date    CHOL 211 (H) 08/05/2024    CHOL 205 (H) 07/26/2023    CHOL 197 06/08/2021     Lab Results   Component Value Date    HDL 56 08/05/2024    HDL 47 07/26/2023    HDL 56 06/08/2021     Lab Results   Component Value Date    LDLCALC 138 (H) 08/05/2024     No results found for: "DLDL"    Lab Results   Component Value Date    TRIG 93 08/05/2024    TRIG 124 07/26/2023    TRIG 78 06/08/2021       f1 No results found for: "CHOLHDL"      Assessment & Plan:  Low cardiovascular risk    Therapeutic lifestyle changes     Continue to monitor    Orders:  -     Lipid Panel; Future; Expected date: 08/14/2025            Follow Up:  Follow up in about 1 year (around 8/14/2025) for Well Adult, chronic health conditions.          "

## 2024-09-25 DIAGNOSIS — F32.5 MAJOR DEPRESSIVE DISORDER IN FULL REMISSION, UNSPECIFIED WHETHER RECURRENT: ICD-10-CM

## 2024-09-25 RX ORDER — SERTRALINE HYDROCHLORIDE 25 MG/1
25 TABLET, FILM COATED ORAL
Qty: 90 TABLET | Refills: 3 | Status: SHIPPED | OUTPATIENT
Start: 2024-09-25

## 2024-10-25 ENCOUNTER — PATIENT MESSAGE (OUTPATIENT)
Dept: OBSTETRICS AND GYNECOLOGY | Facility: CLINIC | Age: 59
End: 2024-10-25
Payer: OTHER GOVERNMENT

## 2025-02-05 ENCOUNTER — OFFICE VISIT (OUTPATIENT)
Dept: FAMILY MEDICINE | Facility: CLINIC | Age: 60
End: 2025-02-05
Payer: OTHER GOVERNMENT

## 2025-02-05 VITALS
TEMPERATURE: 98 F | DIASTOLIC BLOOD PRESSURE: 70 MMHG | OXYGEN SATURATION: 95 % | SYSTOLIC BLOOD PRESSURE: 118 MMHG | WEIGHT: 205.38 LBS | HEART RATE: 83 BPM | BODY MASS INDEX: 34.22 KG/M2 | HEIGHT: 65 IN

## 2025-02-05 DIAGNOSIS — G56.03 BILATERAL CARPAL TUNNEL SYNDROME: Primary | ICD-10-CM

## 2025-02-05 PROCEDURE — 99214 OFFICE O/P EST MOD 30 MIN: CPT | Mod: ,,, | Performed by: FAMILY MEDICINE

## 2025-02-05 NOTE — PROGRESS NOTES
"SUBJECTIVE:  HPI    Cecilia Fleming is a 59 y.o. female here for Numbness (In hands).   History of Present Illness    CHIEF COMPLAINT:  Patient presents today for numbness in hands.    NEUROLOGICAL SYMPTOMS:  She reports bilateral hand numbness and weakness, right worse than left, with significant loss of  strength. The symptoms interfere with daily activities such as holding coffee cups, sewing, and eating. The numbness and tingling wake her nightly, requiring repositioning with pillows and hand shaking for relief. Symptoms appear to be position-dependent, as she sleeps on either side. She previously received chiropractic treatment which provided temporary relief from certain maneuvers, but discontinued due to symptom recurrence after sleeping requiring daily visits.        She denies any neck pain.    Cecilia's allergies, medications, history, and problem list were updated as appropriate.    ROS:  Pertinent ROS as above, otherwise negative    OBJECTIVE:  Vital signs  Visit Vitals  /70 (BP Location: Right arm, Patient Position: Sitting)   Pulse 83   Temp 98.3 °F (36.8 °C) (Temporal)   Ht 5' 5" (1.651 m)   Wt 93.2 kg (205 lb 6.4 oz)   LMP  (LMP Unknown)   SpO2 95%   BMI 34.18 kg/m²          PHYSICAL EXAM:  General: Awake, alert, no acute distress  Musculoskeletal:  Positive Phalen's test on the right but negative Phalen's on the left.  Positive Tinel sign the right and negative on the left.  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 Bilateral carpal tunnel syndrome  Assessment & Plan:  Discussed treatment options     At this time, patient is not enthusiastic about surgery     She will begin a therapeutic trial of nocturnal wrist splints    We can consider injections in the future if she does not get any relief with wrist splints and she still wants to postpone surgery.    If she decides to proceed with surgery, we can get a nerve conduction study and " refer          This note was generated with the assistance of ambient listening technology. Verbal consent was obtained by the patient and accompanying visitor(s) for the recording of patient appointment to facilitate this note. I attest to having reviewed and edited the generated note for accuracy, though some syntax or spelling errors may persist. Please contact the author of this note for any clarification.

## 2025-02-05 NOTE — ASSESSMENT & PLAN NOTE
Discussed treatment options     At this time, patient is not enthusiastic about surgery     She will begin a therapeutic trial of nocturnal wrist splints    We can consider injections in the future if she does not get any relief with wrist splints and she still wants to postpone surgery.    If she decides to proceed with surgery, we can get a nerve conduction study and refer

## 2025-03-10 ENCOUNTER — PATIENT MESSAGE (OUTPATIENT)
Dept: OBSTETRICS AND GYNECOLOGY | Facility: CLINIC | Age: 60
End: 2025-03-10
Payer: OTHER GOVERNMENT

## 2025-03-10 ENCOUNTER — PATIENT MESSAGE (OUTPATIENT)
Dept: FAMILY MEDICINE | Facility: CLINIC | Age: 60
End: 2025-03-10
Payer: OTHER GOVERNMENT

## 2025-03-10 DIAGNOSIS — G56.03 BILATERAL CARPAL TUNNEL SYNDROME: Primary | ICD-10-CM

## 2025-03-13 ENCOUNTER — PATIENT MESSAGE (OUTPATIENT)
Dept: FAMILY MEDICINE | Facility: CLINIC | Age: 60
End: 2025-03-13
Payer: OTHER GOVERNMENT

## 2025-03-20 ENCOUNTER — TELEPHONE (OUTPATIENT)
Dept: OBSTETRICS AND GYNECOLOGY | Facility: CLINIC | Age: 60
End: 2025-03-20
Payer: OTHER GOVERNMENT

## 2025-03-20 NOTE — TELEPHONE ENCOUNTER
----- Message from [employer] sent at 3/20/2025 11:15 AM CDT -----  Contact: Cecilia  .Type:  Sooner Apoointment RequestCaller is requesting a sooner appointment.  Caller declined first available appointment listed below.  Caller will not accept being placed on the waitlist and is requesting a message be sent to doctor.Name of Caller:Cecilia When is the first available appointment?Symptoms: Pt has upcoming appt 3/27, marked as reschedule is needed. Dale booked out Would the patient rather a call back or a response via [facility]?  Call Best Call Back Number:[phone] Additional Information:

## 2025-03-21 DIAGNOSIS — Z78.0 MENOPAUSE: Primary | ICD-10-CM

## 2025-03-21 RX ORDER — ESTRADIOL 10 UG/1
10 TABLET, FILM COATED VAGINAL DAILY
Qty: 30 TABLET | Refills: 1 | Status: SHIPPED | OUTPATIENT
Start: 2025-03-21 | End: 2026-03-21

## 2025-03-31 ENCOUNTER — PATIENT MESSAGE (OUTPATIENT)
Dept: OBSTETRICS AND GYNECOLOGY | Facility: CLINIC | Age: 60
End: 2025-03-31
Payer: OTHER GOVERNMENT

## 2025-04-14 DIAGNOSIS — Z78.0 MENOPAUSE: ICD-10-CM

## 2025-04-14 RX ORDER — ESTRADIOL 10 UG/1
10 TABLET, FILM COATED VAGINAL DAILY
Qty: 30 TABLET | Refills: 6 | Status: SHIPPED | OUTPATIENT
Start: 2025-04-14 | End: 2026-04-14

## 2025-04-24 ENCOUNTER — PATIENT MESSAGE (OUTPATIENT)
Dept: OBSTETRICS AND GYNECOLOGY | Facility: CLINIC | Age: 60
End: 2025-04-24
Payer: OTHER GOVERNMENT

## 2025-04-24 DIAGNOSIS — Z78.0 MENOPAUSE: Primary | ICD-10-CM

## 2025-04-25 RX ORDER — ESTRADIOL 1 MG/G
1 GEL TOPICAL DAILY
Qty: 90 G | Refills: 3 | Status: SHIPPED | OUTPATIENT
Start: 2025-04-25 | End: 2026-04-25

## 2025-05-02 ENCOUNTER — OFFICE VISIT (OUTPATIENT)
Dept: OBSTETRICS AND GYNECOLOGY | Facility: CLINIC | Age: 60
End: 2025-05-02
Payer: OTHER GOVERNMENT

## 2025-05-02 VITALS
SYSTOLIC BLOOD PRESSURE: 122 MMHG | WEIGHT: 206.81 LBS | HEART RATE: 82 BPM | BODY MASS INDEX: 34.41 KG/M2 | DIASTOLIC BLOOD PRESSURE: 78 MMHG

## 2025-05-02 DIAGNOSIS — Z01.419 WELL WOMAN EXAM WITH ROUTINE GYNECOLOGICAL EXAM: ICD-10-CM

## 2025-05-02 DIAGNOSIS — F32.A ANXIETY AND DEPRESSION: Primary | ICD-10-CM

## 2025-05-02 DIAGNOSIS — F32.5 MAJOR DEPRESSIVE DISORDER IN FULL REMISSION, UNSPECIFIED WHETHER RECURRENT: ICD-10-CM

## 2025-05-02 DIAGNOSIS — F41.9 ANXIETY AND DEPRESSION: Primary | ICD-10-CM

## 2025-05-02 DIAGNOSIS — Z78.0 MENOPAUSE: ICD-10-CM

## 2025-05-02 DIAGNOSIS — Z12.31 BREAST CANCER SCREENING BY MAMMOGRAM: ICD-10-CM

## 2025-05-02 DIAGNOSIS — N95.2 VAGINAL ATROPHY: ICD-10-CM

## 2025-05-02 RX ORDER — ESTRADIOL 1 MG/G
1 GEL TOPICAL DAILY
Qty: 90 G | Refills: 4 | Status: SHIPPED | OUTPATIENT
Start: 2025-05-02 | End: 2026-05-02

## 2025-05-02 RX ORDER — SERTRALINE HYDROCHLORIDE 25 MG/1
25 TABLET, FILM COATED ORAL DAILY
Qty: 90 TABLET | Refills: 4 | Status: SHIPPED | OUTPATIENT
Start: 2025-05-02

## 2025-05-02 RX ORDER — ESTRADIOL 10 UG/1
10 TABLET, FILM COATED VAGINAL DAILY
Qty: 24 TABLET | Refills: 4 | Status: SHIPPED | OUTPATIENT
Start: 2025-05-02 | End: 2026-05-02

## 2025-05-02 NOTE — PROGRESS NOTES
Subjective:       Patient ID: Cecilia Fleming is a 59 y.o. female.    Chief Complaint:  Well Woman      History of Present Illness  She is doing well.  No new health issues,  No abnormal bleeding, No GI or Gu concerns,  No dyspareunia.   She is on her HRT and doing well   no concerns  her family hx is neg for colon Ca .  She is using vagifem.  Divigel     she is on the zoloft and doing well.         HPI    Vitals:    25 1059   BP: 122/78   Pulse: 82   Weight: 93.8 kg (206 lb 12.8 oz)       GYN & OB History  No LMP recorded (lmp unknown). Patient has had a hysterectomy.     Date of Last Pap: No result found    OB History    Para Term  AB Living   8 8 8   8   SAB IAB Ectopic Multiple Live Births             # Outcome Date GA Lbr Phani/2nd Weight Sex Type Anes PTL Lv   8 Term            7 Term            6 Term            5 Term            4 Term            3 Term            2 Term            1 Term              Past Medical History:   Diagnosis Date    Diverticulitis     Diverticulosis of colon 2023    Hyperlipidemia 2023    Obstructive sleep apnea syndrome 2023    [facility]    Vitamin D deficiency 2023        Past Surgical History:   Procedure Laterality Date    CHOLECYSTECTOMY  2017    COLONOSCOPY  10/26/2015    Diverticulosis and internal hemorrhoids    HYSTERECTOMY      HYSTEROSCOPY      INGUINAL HERNIA REPAIR Right     KNEE SURGERY      LATERAL EPICONDYLE RELEASE Right 2018    TOE SURGERY  2023        Review of Systems  Review of Systems   Constitutional:  Negative for activity change.   Eyes:  Negative for visual disturbance.   Respiratory:  Negative for shortness of breath.    Cardiovascular:  Negative for chest pain.   Gastrointestinal:  Negative for abdominal pain.   Genitourinary:  Negative for vaginal bleeding.        No abnormal vaginal bleeding   Musculoskeletal:  Negative for back pain.    Integumentary:  Negative for rash and breast mass.   Neurological:  Negative for numbness.   Psychiatric/Behavioral:          No mood disturbance or changes    Breast: Negative for mass            Objective:    Physical Exam:   Constitutional: She is oriented to person, place, and time. She appears well-developed.    HENT:   Head: Normocephalic.     Neck: Trachea normal. No thyromegaly present.    Cardiovascular:  Normal rate, regular rhythm and normal heart sounds.             Pulmonary/Chest: Effort normal and breath sounds normal. Right breast exhibits no mass, no nipple discharge and no skin change. Left breast exhibits no mass, no nipple discharge and no skin change.        Abdominal: Soft. There is no abdominal tenderness. There is no rigidity and no guarding.     Genitourinary:    Vagina and rectum normal.      Pelvic exam was performed with patient supine.   There is no lesion on the right labia. There is no lesion on the left labia. Right adnexum displays no mass and no tenderness. Left adnexum displays no mass and no tenderness. No vaginal discharge in the vagina. Vaginal atrophy noted. Cervix is absent.Uterus is absent.              Lymphadenopathy:        Head (right side): No submental and no submandibular adenopathy present.        Head (left side): No submental and no submandibular adenopathy present.     She has no cervical adenopathy.    Neurological: She is alert and oriented to person, place, and time.    Skin: Skin is warm.    Psychiatric: She has a normal mood and affect. Her speech is normal and behavior is normal. Thought content normal.          Assessment:        1. Anxiety and depression    2. Menopause    3. Well woman exam with routine gynecological exam    4. Breast cancer screening by mammogram    5. Vaginal atrophy               Plan:             Pap   Mammogram  Follow up one year or sooner if needed  Self breast exams  Consider health profile if labs not done with PCP  Recommend  colonoscopy as indicated  Bone density discussed   Pt is aware we call all results. If she does not hear from our office regarding her result within a week of having a study or procedure performed she is to call the office so that we can research the result for her as I do not know when she is scheduling her procedures.   Chaperone was present

## 2025-05-05 DIAGNOSIS — N95.2 VAGINAL ATROPHY: ICD-10-CM

## 2025-05-05 DIAGNOSIS — Z78.0 MENOPAUSE: ICD-10-CM

## 2025-05-06 RX ORDER — ESTRADIOL 10 UG/1
INSERT VAGINAL
Qty: 24 TABLET | Refills: 3 | Status: SHIPPED | OUTPATIENT
Start: 2025-05-06

## 2025-08-21 ENCOUNTER — OFFICE VISIT (OUTPATIENT)
Dept: FAMILY MEDICINE | Facility: CLINIC | Age: 60
End: 2025-08-21
Payer: OTHER GOVERNMENT

## 2025-08-21 VITALS
BODY MASS INDEX: 33.66 KG/M2 | TEMPERATURE: 98 F | HEIGHT: 65 IN | HEART RATE: 71 BPM | WEIGHT: 202 LBS | DIASTOLIC BLOOD PRESSURE: 74 MMHG | OXYGEN SATURATION: 98 % | SYSTOLIC BLOOD PRESSURE: 120 MMHG

## 2025-08-21 DIAGNOSIS — E78.2 MIXED HYPERLIPIDEMIA: ICD-10-CM

## 2025-08-21 DIAGNOSIS — Z00.01 ENCOUNTER FOR WELL ADULT EXAM WITH ABNORMAL FINDINGS: Primary | ICD-10-CM

## 2025-08-21 DIAGNOSIS — R73.03 PREDIABETES: ICD-10-CM

## 2025-08-21 DIAGNOSIS — G47.33 OBSTRUCTIVE SLEEP APNEA SYNDROME: ICD-10-CM

## 2025-08-21 DIAGNOSIS — Z11.59 NEED FOR HEPATITIS C SCREENING TEST: ICD-10-CM

## 2025-08-21 PROCEDURE — 99396 PREV VISIT EST AGE 40-64: CPT | Mod: ,,, | Performed by: FAMILY MEDICINE
